# Patient Record
Sex: FEMALE | Race: WHITE | NOT HISPANIC OR LATINO | URBAN - METROPOLITAN AREA
[De-identification: names, ages, dates, MRNs, and addresses within clinical notes are randomized per-mention and may not be internally consistent; named-entity substitution may affect disease eponyms.]

---

## 2018-07-01 ENCOUNTER — EMERGENCY (EMERGENCY)
Facility: HOSPITAL | Age: 58
LOS: 1 days | Discharge: HOME | End: 2018-07-01
Attending: EMERGENCY MEDICINE
Payer: COMMERCIAL

## 2018-07-01 VITALS
RESPIRATION RATE: 16 BRPM | OXYGEN SATURATION: 98 % | HEART RATE: 92 BPM | DIASTOLIC BLOOD PRESSURE: 74 MMHG | SYSTOLIC BLOOD PRESSURE: 119 MMHG | TEMPERATURE: 98 F

## 2018-07-01 PROCEDURE — 99282 EMERGENCY DEPT VISIT SF MDM: CPT

## 2018-07-01 PROCEDURE — 99284 EMERGENCY DEPT VISIT MOD MDM: CPT | Mod: 25

## 2018-07-01 PROCEDURE — 73562 X-RAY EXAM OF KNEE 3: CPT | Mod: 26,RT

## 2018-07-01 PROCEDURE — 73562 X-RAY EXAM OF KNEE 3: CPT

## 2018-07-01 RX ORDER — IBUPROFEN 200 MG
600 TABLET ORAL ONCE
Qty: 0 | Refills: 0 | Status: COMPLETED | OUTPATIENT
Start: 2018-07-01 | End: 2018-07-01

## 2018-07-01 RX ADMIN — Medication 600 MILLIGRAM(S): at 18:48

## 2018-07-01 RX ADMIN — Medication 600 MILLIGRAM(S): at 18:20

## 2018-07-01 NOTE — ED PROVIDER NOTE - PROGRESS NOTE DETAILS
On exam, knee effusion. No fracture on xray. Dr Garcia tapped knee and administered Depo-Medrol will dc with follow up. Pt is well appearing walking with steady gait, stable for discharge and follow up without fail with medical doctor. I had a detailed discussion with the patient and/or guardian regarding the historical points, exam findings, and any diagnostic results supporting the discharge diagnosis. Pt educated on care and need for follow up. Strict return instructions and red flag signs and symptoms discussed with patient. Questions answered. Pt shows understanding of discharge information and agrees to follow.

## 2018-07-01 NOTE — ED PROVIDER NOTE - OBJECTIVE STATEMENT
58 y/o F pt with no significant PMHx presents to ED c/o right knee pain and swelling x 1 week. Pt denies falls, trauma, weakness, tingling, numbness, fever, chills, or any other complaints.

## 2018-07-01 NOTE — ED PROVIDER NOTE - MEDICAL DECISION MAKING DETAILS
58 y/o F pt with R knee pain. Will check X-ray, give pain medication, and reassess. orthopedic follow up

## 2018-07-01 NOTE — CONSULT NOTE ADULT - SUBJECTIVE AND OBJECTIVE BOX
HPI: Patient is a 58 y/o F who presents to ED c/o right knee pain and swelling x 1 week.    PAST MEDICAL & SURGICAL HISTORY:  No pertinent past medical history  No significant past surgical history    Review of systems: Non Contributory    MEDICATIONS  (STANDING):    Allergies: No known Allergies    Vital Signs Last 24 Hrs  T(C): 36.6 (01 Jul 2018 16:53), Max: 36.6 (01 Jul 2018 16:53)  T(F): 97.8 (01 Jul 2018 16:53), Max: 97.8 (01 Jul 2018 16:53)  HR: 92 (01 Jul 2018 16:53) (92 - 92)  BP: 119/74 (01 Jul 2018 16:53) (119/74 - 119/74)  BP(mean): --  RR: 16 (01 Jul 2018 16:53) (16 - 16)  SpO2: 98% (01 Jul 2018 16:53) (98% - 98%)    Physical Examination:    Musculoskeletal:         Neurovascularly Intact    RADIOLOGY & ADDITIONAL STUDIES:    ASSESSMENT:    PLAN/RECOMMENDATION:    FOLLOW UP: HPI: Patient is a 58 y/o F who presents to ED c/o right knee pain and swelling x 1 week. Patient denies any trauma and states that pain and swelling has been progressively worsening over the past few days. Patient states that she had a MRI of her right knee done a few months ago but she does remember the results.     PAST MEDICAL & SURGICAL HISTORY:  No pertinent past medical history  No significant past surgical history    Review of systems: Non Contributory    MEDICATIONS  (STANDING):    Allergies: No known Allergies    Vital Signs Last 24 Hrs  T(C): 36.6 (01 Jul 2018 16:53), Max: 36.6 (01 Jul 2018 16:53)  T(F): 97.8 (01 Jul 2018 16:53), Max: 97.8 (01 Jul 2018 16:53)  HR: 92 (01 Jul 2018 16:53) (92 - 92)  BP: 119/74 (01 Jul 2018 16:53) (119/74 - 119/74)  BP(mean): --  RR: 16 (01 Jul 2018 16:53) (16 - 16)  SpO2: 98% (01 Jul 2018 16:53) (98% - 98%)    Physical Examination:    Musculoskeletal: Right knee: Positive tenderness to palpation of joint line, 2 + effusion, pain on flexion of the knee, decreased range of motion, positive Patricia maneuver.     Neurovascularly Intact    RADIOLOGY & ADDITIONAL STUDIES: X-ray of right knee done in the ER shows minimal degenerative changes.     ASSESSMENT: Right knee sprain/mild DJD/pain and swelling     PLAN/RECOMMENDATION: Under sterile condition, right knee was aspirated and about 15 CC of yellowish fluid was obtained, and was injected with 40 mg of Depomedrol and 5 CC of lidocaine. Patient tolerated procedure well and expressed relief of pain. A long leg splint/immobilizer was applied. Cane was given.     Apply ice. Take anti-inflammatory medication. Apply Voltaren gel.     FOLLOW UP: With office in with MRI report and CD of right knee that was already performed a few months ago.

## 2022-08-23 NOTE — ED ADULT NURSE NOTE - [id number]_ED
No